# Patient Record
Sex: MALE
[De-identification: names, ages, dates, MRNs, and addresses within clinical notes are randomized per-mention and may not be internally consistent; named-entity substitution may affect disease eponyms.]

---

## 2020-03-18 PROBLEM — Z00.00 ENCOUNTER FOR PREVENTIVE HEALTH EXAMINATION: Status: ACTIVE | Noted: 2020-03-18

## 2020-04-16 ENCOUNTER — APPOINTMENT (OUTPATIENT)
Dept: PULMONOLOGY | Facility: CLINIC | Age: 50
End: 2020-04-16

## 2021-05-13 ENCOUNTER — APPOINTMENT (OUTPATIENT)
Dept: PULMONOLOGY | Facility: CLINIC | Age: 51
End: 2021-05-13
Payer: COMMERCIAL

## 2021-05-13 VITALS
OXYGEN SATURATION: 97 % | BODY MASS INDEX: 30.35 KG/M2 | DIASTOLIC BLOOD PRESSURE: 74 MMHG | HEIGHT: 70 IN | HEART RATE: 94 BPM | SYSTOLIC BLOOD PRESSURE: 116 MMHG | TEMPERATURE: 98 F | WEIGHT: 212 LBS

## 2021-05-13 DIAGNOSIS — R06.83 SNORING: ICD-10-CM

## 2021-05-13 PROCEDURE — 99204 OFFICE O/P NEW MOD 45 MIN: CPT

## 2021-05-13 NOTE — REVIEW OF SYSTEMS
[Fatigue] : fatigue [Recent Wt Loss (___ Lbs)] : recent [unfilled] ~Ulb weight loss [Snoring] : snoring [Negative] : Psychiatric

## 2021-05-13 NOTE — PHYSICAL EXAM
[Well Groomed] : well groomed [General Appearance - In No Acute Distress] : no acute distress [Normal Oropharynx] : normal oropharynx [Neck Appearance] : the appearance of the neck was normal [Neck Cervical Mass (___cm)] : no neck mass was observed [Jugular Venous Distention Increased] : there was no jugular-venous distention [Thyroid Diffuse Enlargement] : the thyroid was not enlarged [Thyroid Nodule] : there were no palpable thyroid nodules [Heart Rate And Rhythm] : heart rate was normal and rhythm regular [Heart Sounds] : normal S1 and S2 [Heart Sounds Gallop] : no gallops [Murmurs] : no murmurs [Heart Sounds Pericardial Friction Rub] : no pericardial rub [Auscultation Breath Sounds / Voice Sounds] : lungs were clear to auscultation bilaterally [Abnormal Walk] : normal gait [Musculoskeletal - Swelling] : no joint swelling seen [Motor Tone] : muscle strength and tone were normal [Skin Color & Pigmentation] : normal skin color and pigmentation [Skin Turgor] : normal skin turgor [No Focal Deficits] : no focal deficits [Oriented To Time, Place, And Person] : oriented to person, place, and time [Impaired Insight] : insight and judgment were intact [Affect] : the affect was normal [Normal Conjunctiva] : the conjunctiva exhibited no abnormalities [Eyelids - No Xanthelasma] : the eyelids demonstrated no xanthelasmas [Nail Clubbing] : no clubbing of the fingernails [Cyanosis, Localized] : no localized cyanosis [Petechial Hemorrhages (___cm)] : no petechial hemorrhages [] : no ischemic changes

## 2021-05-13 NOTE — ASSESSMENT
[FreeTextEntry1] : 51 y/o  M with c/o snoring who is here for evaluation.  Snoring,  no witnessed apnea, [place] 4.  Told patient snoring and possible apnea made worse by nightly alcohol use.   Will evaluate with unattended home sleep testing

## 2021-05-13 NOTE — HISTORY OF PRESENT ILLNESS
[FreeTextEntry1] : 05/13/2021 :  DEONTE STEEN is a 50 year old male   with no medical hx who is here for initial evaluation for possible sleep apnea. \par He was referred here by his  PCP with c/o snoring. He denies witnessed apnea or gasping for air. \par He feels tried  and sleepy during a day.  His work hours are from  3pm-12 5x/week.  No real excessive daytime somnolence with [place] sleepiness scale (out of 24 points) = 4.  Has lost 6lb in the past year. \par \par Sleep Routine:\par \par He goes to bed at 2A sleep latency is about 5 min, he does not wakes up in middle of the night and then he is up at 12P. He does not nap . At least one alcoholic drink before bedtime nightly. Denies morning headache, parasomnia, restless legs, leg movements, cataplexy or sleep paralysis.  Some nasal congestion in AM. \par \par \par

## 2021-05-29 ENCOUNTER — APPOINTMENT (OUTPATIENT)
Dept: SLEEP CENTER | Facility: HOME HEALTH | Age: 51
End: 2021-05-29
Payer: COMMERCIAL

## 2021-05-29 ENCOUNTER — OUTPATIENT (OUTPATIENT)
Dept: OUTPATIENT SERVICES | Facility: HOSPITAL | Age: 51
LOS: 1 days | End: 2021-05-29
Payer: COMMERCIAL

## 2021-05-29 PROCEDURE — 95800 SLP STDY UNATTENDED: CPT | Mod: 26

## 2021-05-29 PROCEDURE — 95800 SLP STDY UNATTENDED: CPT

## 2021-06-01 DIAGNOSIS — G47.33 OBSTRUCTIVE SLEEP APNEA (ADULT) (PEDIATRIC): ICD-10-CM

## 2021-06-03 ENCOUNTER — APPOINTMENT (OUTPATIENT)
Dept: PULMONOLOGY | Facility: CLINIC | Age: 51
End: 2021-06-03
Payer: COMMERCIAL

## 2021-06-03 VITALS
SYSTOLIC BLOOD PRESSURE: 136 MMHG | BODY MASS INDEX: 29.63 KG/M2 | HEART RATE: 71 BPM | OXYGEN SATURATION: 99 % | HEIGHT: 70 IN | TEMPERATURE: 97.5 F | WEIGHT: 207 LBS | DIASTOLIC BLOOD PRESSURE: 84 MMHG

## 2021-06-03 DIAGNOSIS — G47.33 OBSTRUCTIVE SLEEP APNEA (ADULT) (PEDIATRIC): ICD-10-CM

## 2021-06-03 PROCEDURE — 99214 OFFICE O/P EST MOD 30 MIN: CPT

## 2021-06-03 NOTE — ASSESSMENT
[FreeTextEntry1] : obstructive sleep apnea, mild \par \par Treatment options for sleep disordered breathing were discussed.  The most rapid and successful treatment remains nasal CPAP or BilevelPAP.  Alternatives include upper airway surgery such as uvulopharyngoplasty or a dental appliance (better for milder cases).  Recently hypoglossal nerve stimulation has been used.  Positional therapy (avoidance of supine posture) can be helpful, and all patients should try to maintain a healthy weight and avoid alcohol or other sedating medications close to bedtime \par \par Don't think CPAP rx needed now; further weight loss may help. although not far above ideal body weight.  Avoid alcohol at HS.  Suggest he get evaluated for oral appliance (mandibular advancer) - given info

## 2021-06-03 NOTE — HISTORY OF PRESENT ILLNESS
[FreeTextEntry1] : 05/13/2021 :  DEONTE STEEN is a 50 year old male   with no medical hx who is here for initial evaluation for possible sleep apnea. \par He was referred here by his  PCP with c/o snoring. He denies witnessed apnea or gasping for air. \par He feels tried  and sleepy during a day.  His work hours are from  3pm-12 5x/week.  No real excessive daytime somnolence with [place] sleepiness scale (out of 24 points) = 4.  Has lost 6lb in the past year. \par \par Sleep Routine:\par \par He goes to bed at 2A sleep latency is about 5 min, he does not wakes up in middle of the night and then he is up at 12P. He does not nap . At least one alcoholic drink before bedtime nightly. Denies morning headache, parasomnia, restless legs, leg movements, cataplexy or sleep paralysis.  Some nasal congestion in AM. \par \par 6/3/2021:  recent unattended home sleep testing reviewed w patient: was typical night, no alcohol before bed, Apnea Hypopnea Index 12.3, no significant oxygen desaturations, good sleep, TST 6h. Trying to lose weight, lost 5 lbs since last visit\par